# Patient Record
Sex: MALE | Race: WHITE
[De-identification: names, ages, dates, MRNs, and addresses within clinical notes are randomized per-mention and may not be internally consistent; named-entity substitution may affect disease eponyms.]

---

## 2018-07-08 ENCOUNTER — HOSPITAL ENCOUNTER (EMERGENCY)
Dept: HOSPITAL 25 - ED | Age: 63
Discharge: HOME | End: 2018-07-08
Payer: COMMERCIAL

## 2018-07-08 VITALS — SYSTOLIC BLOOD PRESSURE: 139 MMHG | DIASTOLIC BLOOD PRESSURE: 83 MMHG

## 2018-07-08 DIAGNOSIS — I10: ICD-10-CM
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
## 2018-07-08 DIAGNOSIS — Z87.891: ICD-10-CM

## 2018-07-08 DIAGNOSIS — S51.812A: Primary | ICD-10-CM
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
## 2018-07-08 PROCEDURE — 90471 IMMUNIZATION ADMIN: CPT

## 2018-07-08 PROCEDURE — 12002 RPR S/N/AX/GEN/TRNK2.6-7.5CM: CPT

## 2018-07-08 PROCEDURE — 90715 TDAP VACCINE 7 YRS/> IM: CPT

## 2018-07-08 PROCEDURE — 99282 EMERGENCY DEPT VISIT SF MDM: CPT
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
## 2018-08-21 ENCOUNTER — HOSPITAL ENCOUNTER (EMERGENCY)
Dept: HOSPITAL 25 - ED | Age: 63
Discharge: HOME | End: 2018-08-21
Payer: COMMERCIAL

## 2018-08-21 VITALS — SYSTOLIC BLOOD PRESSURE: 162 MMHG | DIASTOLIC BLOOD PRESSURE: 116 MMHG

## 2018-08-21 DIAGNOSIS — S01.81XA: Primary | ICD-10-CM

## 2018-08-21 DIAGNOSIS — I10: ICD-10-CM

## 2018-08-21 DIAGNOSIS — Z87.891: ICD-10-CM

## 2018-08-21 DIAGNOSIS — Z79.82: ICD-10-CM
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
## 2018-08-21 PROCEDURE — 99282 EMERGENCY DEPT VISIT SF MDM: CPT

## 2018-08-21 PROCEDURE — 12011 RPR F/E/E/N/L/M 2.5 CM/<: CPT
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
## 2018-08-25 ENCOUNTER — HOSPITAL ENCOUNTER (EMERGENCY)
Dept: HOSPITAL 25 - ED | Age: 63
Discharge: HOME | End: 2018-08-25
Payer: COMMERCIAL

## 2018-08-25 VITALS — SYSTOLIC BLOOD PRESSURE: 122 MMHG | DIASTOLIC BLOOD PRESSURE: 84 MMHG

## 2018-08-25 DIAGNOSIS — Z48.02: ICD-10-CM

## 2018-08-25 DIAGNOSIS — S01.01XD: Primary | ICD-10-CM

## 2018-08-25 DIAGNOSIS — Z87.891: ICD-10-CM
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
## 2019-12-03 ENCOUNTER — HOSPITAL ENCOUNTER (EMERGENCY)
Dept: HOSPITAL 25 - ED | Age: 64
Discharge: HOME | End: 2019-12-03
Payer: COMMERCIAL

## 2019-12-03 VITALS — SYSTOLIC BLOOD PRESSURE: 140 MMHG | DIASTOLIC BLOOD PRESSURE: 108 MMHG

## 2019-12-03 DIAGNOSIS — I10: ICD-10-CM

## 2019-12-03 DIAGNOSIS — I48.91: ICD-10-CM

## 2019-12-03 DIAGNOSIS — Z79.899: ICD-10-CM

## 2019-12-03 DIAGNOSIS — R53.83: Primary | ICD-10-CM

## 2019-12-03 DIAGNOSIS — Z87.891: ICD-10-CM

## 2019-12-03 LAB
ALBUMIN SERPL BCG-MCNC: 3.9 G/DL (ref 3.2–5.2)
ALBUMIN/GLOB SERPL: 1.7 {RATIO} (ref 1–3)
ALP SERPL-CCNC: 52 U/L (ref 34–104)
ALT SERPL W P-5'-P-CCNC: 16 U/L (ref 7–52)
ANION GAP SERPL CALC-SCNC: 6 MMOL/L (ref 2–11)
AST SERPL-CCNC: 20 U/L (ref 13–39)
BASOPHILS # BLD AUTO: 0 10^3/UL (ref 0–0.2)
BUN SERPL-MCNC: 27 MG/DL (ref 6–24)
BUN/CREAT SERPL: 21.8 (ref 8–20)
CALCIUM SERPL-MCNC: 9.2 MG/DL (ref 8.6–10.3)
CHLORIDE SERPL-SCNC: 100 MMOL/L (ref 101–111)
EOSINOPHIL # BLD AUTO: 0.1 10^3/UL (ref 0–0.6)
GLOBULIN SER CALC-MCNC: 2.3 G/DL (ref 2–4)
GLUCOSE SERPL-MCNC: 92 MG/DL (ref 70–100)
HCO3 SERPL-SCNC: 29 MMOL/L (ref 22–32)
HCT VFR BLD AUTO: 43 % (ref 42–52)
HGB BLD-MCNC: 15 G/DL (ref 14–18)
LYMPHOCYTES # BLD AUTO: 0.3 10^3/UL (ref 1–4.8)
MAGNESIUM SERPL-MCNC: 1.9 MG/DL (ref 1.9–2.7)
MCH RBC QN AUTO: 34 PG (ref 27–31)
MCHC RBC AUTO-ENTMCNC: 35 G/DL (ref 31–36)
MCV RBC AUTO: 95 FL (ref 80–94)
MONOCYTES # BLD AUTO: 0.4 10^3/UL (ref 0–0.8)
NEUTROPHILS # BLD AUTO: 6.1 10^3/UL (ref 1.5–7.7)
NRBC # BLD AUTO: 0 10^3/UL
NRBC BLD QL AUTO: 0.2
PLATELET # BLD AUTO: 164 10^3/UL (ref 150–450)
POTASSIUM SERPL-SCNC: 3.9 MMOL/L (ref 3.5–5)
PROT SERPL-MCNC: 6.2 G/DL (ref 6.4–8.9)
RBC # BLD AUTO: 4.49 10^6 /UL (ref 4.18–5.48)
SODIUM SERPL-SCNC: 135 MMOL/L (ref 135–145)
TROPONIN I SERPL-MCNC: 0.02 NG/ML (ref ?–0.03)
TSH SERPL-ACNC: 0.34 MCIU/ML (ref 0.34–5.6)
WBC # BLD AUTO: 6.9 10^3/UL (ref 3.5–10.8)

## 2019-12-03 PROCEDURE — 96360 HYDRATION IV INFUSION INIT: CPT

## 2019-12-03 PROCEDURE — 93005 ELECTROCARDIOGRAM TRACING: CPT

## 2019-12-03 PROCEDURE — 84443 ASSAY THYROID STIM HORMONE: CPT

## 2019-12-03 PROCEDURE — 80053 COMPREHEN METABOLIC PANEL: CPT

## 2019-12-03 PROCEDURE — 83690 ASSAY OF LIPASE: CPT

## 2019-12-03 PROCEDURE — 85025 COMPLETE CBC W/AUTO DIFF WBC: CPT

## 2019-12-03 PROCEDURE — 84484 ASSAY OF TROPONIN QUANT: CPT

## 2019-12-03 PROCEDURE — 36415 COLL VENOUS BLD VENIPUNCTURE: CPT

## 2019-12-03 PROCEDURE — 96361 HYDRATE IV INFUSION ADD-ON: CPT

## 2019-12-03 PROCEDURE — 83735 ASSAY OF MAGNESIUM: CPT

## 2019-12-03 PROCEDURE — 99283 EMERGENCY DEPT VISIT LOW MDM: CPT

## 2019-12-03 NOTE — ED
Throat Pain/Nasal Congestion





- HPI Summary


HPI Summary: 





Patient is a 65 y/o M presenting to the ED for a chief complaint of fatigue 

that began after shoveling snow on 12/02/19. Patient is present with his 

domestic partner. He also notes having nausea and vomiting that began on 12/02/ 19 and continued into 12/03/19. He denies abdominal pain, chest pain, or cough. 

Patient admits having a dinner of chicken and drinking whiskey and rum before 

going to sleep on 12/02/19. PMHx is significant for HTN, but he denies cardiac 

problems. FMHx of cardiac disease is denied. 





- History of Current Complaint


Chief Complaint: EDNauseaVomitDiarrh


Time Seen by Provider: 12/03/19 15:25


Hx Obtained From: Patient


Onset/Duration: Sudden Onset, Still Present


Severity: Moderate


Associated Signs And Symptoms: Positive: Negative


Cough: None





- Allergies/Home Medications


Allergies/Adverse Reactions: 


 Allergies











Allergy/AdvReac Type Severity Reaction Status Date / Time


 


No Known Allergies Allergy   Verified 12/03/19 14:52











Home Medications: 


 Home Medications





Sildenafil (NF) 100 mg PO PRN 12/03/19 [History]











PMH/Surg Hx/FS Hx/Imm Hx


Previously Healthy: Yes


Endocrine/Hematology History: 


   Denies: Hx Bone Marrow Disease, Hx Diabetes, Hx Sickle Cell Disease, Hx 

Thyroid Disease, Hx Anemia


Cardiovascular History: Reports: Hx Hypertension - on medicaton for


   Denies: Hx Angina, Hx Cardiomegaly, Hx Congestive Heart Failure, Hx Coronary 

Artery Disease, Hx Pacemaker/ICD, Hx Peripheral Vascular Disease, Hx Rheumatic 

Fever, Hx Valvular Heart Disease, Other Cardiovascular Problems/Disorders


Respiratory History: Reports: Hx Asthma - fall


   Denies: Hx Pulmonary Edema, Hx Pulmonary Embolism, Hx Sleep Apnea, Other 

Respiratory Problems/Disorders


GI History: 


   Denies: Hx Cirrhosis, Hx Crohn's Disease, Hx Gastroesophageal Reflux Disease

, Hx Hiatal Hernia, Hx Irritable Bowel, Hx Jaundice, Hx Ulcer, Other GI 

Disorders


 History: 


   Denies: Hx Kidney Infection, Hx Kidney Stones, Other  Problems/Disorders


Musculoskeletal History: Reports: Hx Arthritis - HAND AND LUMBAR SPINE


   Denies: Hx Bursitis, Hx Tendonitis, Other Musculoskeletal History


Sensory History: Reports: Hx Contacts or Glasses, Hx Glaucoma


   Denies: Hx Cataracts, Hx Legally Blind, Hx Deafness, Hx Hearing Aid


Opthamlomology History: Reports: Hx Contacts or Glasses, Hx Glaucoma


   Denies: Hx Cataracts, Hx Legally Blind


EENT History: 


   Denies: Hx Deafness


Neurological History: 


   Denies: Hx Headaches, Hx Migraine, Hx Seizures, Other Neuro Impairments/

Disorders


Psychiatric History: 


   Denies: Hx Anxiety, Hx Depression, Hx Schizophrenia





- Cancer History


Hx Chemotherapy: No





- Surgical History


Surgical History: Yes


Surgery Procedure, Year, and Place: LEFT RING FINGER- [facility].  LEFT FEMUR- 

[facility]- [facility].  LEFT KNEE-1995- ACL REPAIR.  Left first finger [facility]


Hx Anesthesia Reactions: No





- Immunization History


Date of Tetanus Vaccine: Unk


Date of Influenza Vaccine: Fall 2014


Immunizations Up to Date: Yes


Infectious Disease History: No


Infectious Disease History: 


   Denies: Hx Hepatitis, Traveled Outside the [place] in Last 30 Days





- Family History


Known Family History: 


   Negative: Cardiac Disease, Diabetes, Renal Disease, Respiratory Disease





- Social History


Occupation: Employed Full-time


Lives: With Family


Alcohol Use: Daily


Alcohol Amount: 4 to 6 oz daily


Hx Substance Use: No


Substance Use Type: Reports: None


Hx Tobacco Use: Yes


Smoking Status (MU): Former Smoker


Type: Cigarettes





Review of Systems


Positive: Fatigue


Negative: Chest Pain


Negative: Cough


Positive: Vomiting, Nausea.  Negative: Abdominal Pain


All Other Systems Reviewed And Are Negative: Yes





Physical Exam





- Summary


Physical Exam Summary: 





Constitutional: Well-developed, Well-nourished, Alert. (-) Distressed


Skin: Warm, Dry


HENT: Normocephalic; Atraumatic


Eyes: Conjunctiva normal


Neck: Musculoskeletal ROM normal neck. (-) JVD, (-) Stridor, (-) Nuchal rigidity


Cardio: Rhythm regular, rate normal, Heart sounds normal; Intact distal pulses; 

Radial pulses are 2+ and symmetric. (-) Murmur


Pulmonary/Chest wall: Effort normal. (-) Respiratory distress, (-) Wheezes, (-) 

Rales


Abd: Soft, (-) tenderness, (-) Distension, (-) Guarding, (-) Rebound


Musculoskeletal: (-) Edema


Lymph: (-) Cervical adenopathy


Neuro: Alert, Oriented x3


Psych: Mood and affect Normal


Triage Information Reviewed: Yes


Vital Signs On Initial Exam: 


 Initial Vitals











Temp Pulse Resp BP Pulse Ox


 


 99.3 F   82   16   136/74   99 


 


 12/03/19 14:48  12/03/19 14:48  12/03/19 14:48  12/03/19 14:48  12/03/19 14:48











Vital Signs Reviewed: Yes





Procedures





- Sedation


Patient Received Moderate/Deep Sedation with Procedure: No





Diagnostics





- Vital Signs


 Vital Signs











  Temp Pulse Resp BP Pulse Ox


 


 12/03/19 15:21   98   139/102  99


 


 12/03/19 14:48  99.3 F  82  16  136/74  99














- Laboratory


Lab Results: 


 Lab Results











  12/03/19 Range/Units





  16:00 


 


WBC  6.9  (3.5-10.8)  10^3/uL


 


RBC  4.49  (4.18-5.48)  10^6 /uL


 


Hgb  15.0  (14.0-18.0)  g/dL


 


Hct  43  (42-52)  %


 


MCV  95 H  (80-94)  fL


 


MCH  34 H  (27-31)  pg


 


MCHC  35  (31-36)  g/dL


 


RDW  14  (10-15)  %


 


Plt Count  164  (150-450)  10^3/uL


 


MPV  7.9  (7.4-10.4)  fL


 


Neut % (Auto)  88.5  %


 


Lymph % (Auto)  5.0  %


 


Mono % (Auto)  5.3  %


 


Eos % (Auto)  1.0  %


 


Baso % (Auto)  0.2  %


 


Absolute Neuts (auto)  6.1  (1.5-7.7)  10^3/ul


 


Absolute Lymphs (auto)  0.3 L  (1.0-4.8)  10^3/ul


 


Absolute Monos (auto)  0.4  (0-0.8)  10^3/ul


 


Absolute Eos (auto)  0.1  (0-0.6)  10^3/ul


 


Absolute Basos (auto)  0.0  (0-0.2)  10^3/ul


 


Absolute Nucleated RBC  0.0  10^3/ul


 


Nucleated RBC %  0.2  











Result Diagrams: 


 12/03/19 16:00





 12/03/19 16:00


Lab Statement: Any lab studies that have been ordered have been reviewed, and 

results considered in the medical decision making process.





- EKG


  ** 15:38


Cardiac Rate: NL - 91 BPM


EKG Rhythm: Sinus Rhythm


ST Segment: Normal


Ectopy: None


EKG Comparison: No Significant Change - No change from 09/08/15.


Summary of EKG Findings: An EKG at 15:38 reveals normal sinus rhythm 91 BPM, 

nml axis, nml intervals. No STEMI. No acute changes. Inverted P wave in lead 

III. No change from 09/08/15.  Reviewed and interpreted by Dr. Yap.





  ** 18:03


Cardiac Rate: Other Rate - 96 BPM


EKG Rhythm: Atrial Fibrillation


ST Segment: Normal


Ectopy: None


Summary of EKG Findings: An EKG at 18:03 reveals atrial fibrillation with 96 BPM

, nml axis, nml intervals. No STEMI. No acute changes.  Reviewed and 

interpreted by Dr. Yap.





Re-Evaluation





- Re-Evaluation


  ** First Eval


Re-Evaluation Time: 17:00


Change: Improved - feeling better, asking to eat. D/w patient labs notable for 

elevated Cr and likely dehydration. Denies abd pain at this time. Plan for IVF, 

po trial likely TBDC


Comment: At 17:00, patient is improved.





  ** Second


Re-Evaluation Time: 17:52


Change: Unchanged


Comment: At 17:52, patient had atrial flutter/fib in the 140s. No hx. No CP. 

EKG w afib, spontaneously back in sinus.





EENT Course/Dx





- Course


Course Of Treatment: 65 y/o male who p/w fatigue, n/v.  - PE well appearing, 

NAD. Recent EtOH use could be contributing to n/v (now resolved), Denies CP. 

EKG sinus, trop neg.  labs w elevated Cr, plan for IVF.  Patient went into afib 

briefly, back in sinus. D/w cardiology who recommends eliquis 5 BID, metoprolol 

25 XR. F/u PCP. Educated patient on blood thinners and risks. Given first dose 

here. Given metoprolol as well.





- Diagnoses


Provider Diagnoses: 


 Fatigue, Atrial fibrillation








- Provider Notifications


Discussed Care Of Patient With: Qutaybeh Maghaydah - At 18:40, Dr. Phipps 

recommends magnesium and potassium replacement and Eliquis 5 BID. 


Time Discussed With Above Provider: 18:40





Discharge ED





- Sign-Out/Discharge


Documenting (check all that apply): Patient Departure - Discharge





- Discharge Plan


Condition: Stable


Disposition: HOME


Prescriptions: 


Apixaban* [Eliquis*] 5 mg PO BID 30 Days #60 tab


Metoprolol Succinate XL TAB* [Toprol XL TAB*] 25 mg PO DAILY 30 Days #30 tab.xl


Patient Education Materials:  A-fib (Atrial Fibrillation) (ED)


Referrals: 


Ramon Llanos MD [Primary Care Provider] - 


Maghaydah,Qutaybeh, MD [Medical Doctor] - 


Additional Instructions: 


You were seen in the emergency department for fatigue and dehydration. 


If any studies were not completed at the time of discharge you will be called 

with the relevant results.


Please follow up with your primary care doctor in next 2-3 days and return to 

emergency department for worsening fatigue, chest pain, passing out or 

concerning symptoms.


It was a pleasure taking care of you today.





- Billing Disposition and Condition


Condition: STABLE


Disposition: Home





- Attestation Statements


Document Initiated by Grzegorzibnorberto: Yes


Documenting Scribe: Veronique Hicks


Provider For Whom Eduardo is Documenting (Include Credential): Derik Yap MD


Scribe Attestation: 


IVeronique, scribed for Derik Yap MD on 12/05/19 at 1546. 


Scribe Documentation Reviewed: Yes


Provider Attestation: 


The documentation as recorded by the grzegorzibVeronique thornton accurately reflects 

the service I personally performed and the decisions made by me, Derik Yap MD


Status of Scribe Document: Viewed